# Patient Record
Sex: FEMALE | Race: WHITE | Employment: OTHER | URBAN - METROPOLITAN AREA
[De-identification: names, ages, dates, MRNs, and addresses within clinical notes are randomized per-mention and may not be internally consistent; named-entity substitution may affect disease eponyms.]

---

## 2019-02-26 ENCOUNTER — APPOINTMENT (OUTPATIENT)
Dept: CT IMAGING | Age: 49
End: 2019-02-26
Attending: EMERGENCY MEDICINE
Payer: COMMERCIAL

## 2019-02-26 ENCOUNTER — APPOINTMENT (OUTPATIENT)
Dept: GENERAL RADIOLOGY | Age: 49
End: 2019-02-26
Attending: EMERGENCY MEDICINE
Payer: COMMERCIAL

## 2019-02-26 ENCOUNTER — HOSPITAL ENCOUNTER (EMERGENCY)
Age: 49
Discharge: HOME | End: 2019-02-26
Attending: EMERGENCY MEDICINE
Payer: COMMERCIAL

## 2019-02-26 VITALS
TEMPERATURE: 98.1 F | DIASTOLIC BLOOD PRESSURE: 64 MMHG | HEART RATE: 55 BPM | BODY MASS INDEX: 32.43 KG/M2 | HEIGHT: 63 IN | WEIGHT: 183 LBS | OXYGEN SATURATION: 97 % | SYSTOLIC BLOOD PRESSURE: 122 MMHG | RESPIRATION RATE: 16 BRPM

## 2019-02-26 DIAGNOSIS — R07.89 ATYPICAL CHEST PAIN: Primary | ICD-10-CM

## 2019-02-26 DIAGNOSIS — R06.00 DYSPNEA, UNSPECIFIED TYPE: ICD-10-CM

## 2019-02-26 DIAGNOSIS — E27.8 ADRENAL NODULE (HCC): ICD-10-CM

## 2019-02-26 DIAGNOSIS — F43.0 STRESS REACTION: ICD-10-CM

## 2019-02-26 LAB
ANION GAP SERPL CALC-SCNC: 7 MMOL/L (ref 3–18)
ATRIAL RATE: 55 BPM
BASOPHILS # BLD: 0 K/UL (ref 0–0.1)
BASOPHILS NFR BLD: 1 % (ref 0–2)
BUN SERPL-MCNC: 13 MG/DL (ref 7–18)
BUN/CREAT SERPL: 15 (ref 12–20)
CALCIUM SERPL-MCNC: 9.4 MG/DL (ref 8.5–10.1)
CALCULATED P AXIS, ECG09: 55 DEGREES
CALCULATED R AXIS, ECG10: 51 DEGREES
CALCULATED T AXIS, ECG11: 57 DEGREES
CHLORIDE SERPL-SCNC: 109 MMOL/L (ref 100–108)
CO2 SERPL-SCNC: 28 MMOL/L (ref 21–32)
CREAT SERPL-MCNC: 0.87 MG/DL (ref 0.6–1.3)
D DIMER PPP FEU-MCNC: 0.71 UG/ML(FEU)
DIAGNOSIS, 93000: NORMAL
DIFFERENTIAL METHOD BLD: ABNORMAL
EOSINOPHIL # BLD: 0.2 K/UL (ref 0–0.4)
EOSINOPHIL NFR BLD: 2 % (ref 0–5)
ERYTHROCYTE [DISTWIDTH] IN BLOOD BY AUTOMATED COUNT: 12.5 % (ref 11.6–14.5)
GLUCOSE SERPL-MCNC: 93 MG/DL (ref 74–99)
HCT VFR BLD AUTO: 43.3 % (ref 35–45)
HGB BLD-MCNC: 14.4 G/DL (ref 12–16)
LYMPHOCYTES # BLD: 3.8 K/UL (ref 0.9–3.6)
LYMPHOCYTES NFR BLD: 43 % (ref 21–52)
MCH RBC QN AUTO: 30.1 PG (ref 24–34)
MCHC RBC AUTO-ENTMCNC: 33.3 G/DL (ref 31–37)
MCV RBC AUTO: 90.4 FL (ref 74–97)
MONOCYTES # BLD: 0.6 K/UL (ref 0.05–1.2)
MONOCYTES NFR BLD: 7 % (ref 3–10)
NEUTS SEG # BLD: 4.2 K/UL (ref 1.8–8)
NEUTS SEG NFR BLD: 47 % (ref 40–73)
P-R INTERVAL, ECG05: 186 MS
PLATELET # BLD AUTO: 301 K/UL (ref 135–420)
PMV BLD AUTO: 10 FL (ref 9.2–11.8)
POTASSIUM SERPL-SCNC: 4.4 MMOL/L (ref 3.5–5.5)
Q-T INTERVAL, ECG07: 426 MS
QRS DURATION, ECG06: 86 MS
QTC CALCULATION (BEZET), ECG08: 407 MS
RBC # BLD AUTO: 4.79 M/UL (ref 4.2–5.3)
SODIUM SERPL-SCNC: 144 MMOL/L (ref 136–145)
TROPONIN I SERPL-MCNC: <0.02 NG/ML (ref 0–0.04)
TSH SERPL DL<=0.05 MIU/L-ACNC: 3.28 UIU/ML (ref 0.36–3.74)
VENTRICULAR RATE, ECG03: 55 BPM
WBC # BLD AUTO: 8.9 K/UL (ref 4.6–13.2)

## 2019-02-26 PROCEDURE — 74011636320 HC RX REV CODE- 636/320: Performed by: EMERGENCY MEDICINE

## 2019-02-26 PROCEDURE — 71275 CT ANGIOGRAPHY CHEST: CPT

## 2019-02-26 PROCEDURE — 80048 BASIC METABOLIC PNL TOTAL CA: CPT

## 2019-02-26 PROCEDURE — 71045 X-RAY EXAM CHEST 1 VIEW: CPT

## 2019-02-26 PROCEDURE — 99285 EMERGENCY DEPT VISIT HI MDM: CPT

## 2019-02-26 PROCEDURE — 84443 ASSAY THYROID STIM HORMONE: CPT

## 2019-02-26 PROCEDURE — 85025 COMPLETE CBC W/AUTO DIFF WBC: CPT

## 2019-02-26 PROCEDURE — 96374 THER/PROPH/DIAG INJ IV PUSH: CPT

## 2019-02-26 PROCEDURE — 84484 ASSAY OF TROPONIN QUANT: CPT

## 2019-02-26 PROCEDURE — 93005 ELECTROCARDIOGRAM TRACING: CPT

## 2019-02-26 PROCEDURE — 85379 FIBRIN DEGRADATION QUANT: CPT

## 2019-02-26 PROCEDURE — 74011250636 HC RX REV CODE- 250/636: Performed by: EMERGENCY MEDICINE

## 2019-02-26 RX ORDER — NAPROXEN 500 MG/1
500 TABLET ORAL 2 TIMES DAILY WITH MEALS
Qty: 10 TAB | Refills: 0 | Status: SHIPPED | OUTPATIENT
Start: 2019-02-26 | End: 2019-03-03

## 2019-02-26 RX ORDER — BISMUTH SUBSALICYLATE 262 MG
1 TABLET,CHEWABLE ORAL DAILY
COMMUNITY

## 2019-02-26 RX ORDER — KETOROLAC TROMETHAMINE 30 MG/ML
30 INJECTION, SOLUTION INTRAMUSCULAR; INTRAVENOUS
Status: COMPLETED | OUTPATIENT
Start: 2019-02-26 | End: 2019-02-26

## 2019-02-26 RX ORDER — PROPRANOLOL HYDROCHLORIDE 60 MG/1
60 CAPSULE, EXTENDED RELEASE ORAL DAILY
COMMUNITY
End: 2019-03-05

## 2019-02-26 RX ADMIN — KETOROLAC TROMETHAMINE 30 MG: 30 INJECTION INTRAMUSCULAR; INTRAVENOUS at 13:28

## 2019-02-26 RX ADMIN — IOPAMIDOL 100 ML: 755 INJECTION, SOLUTION INTRAVENOUS at 12:39

## 2019-02-26 NOTE — ED PROVIDER NOTES
EMERGENCY DEPARTMENT HISTORY AND PHYSICAL EXAM 
 
9:43 AM 
 
 
Date: 2/26/2019 Patient Name: John Hardin History of Presenting Illness Chief Complaint Patient presents with  Chest Pain History Provided By: Patient Additional History (Context): John Hardin is a 51 y.o. female who presents with acute onset of chest pain that started last night. Describes chest pain as pressure and located in the center of her chest. Pt reports mild cough, which started today. Pt also reports shortness of breath only when ambulating. Reports no modifying factors for her symptoms. Pt also c/o constant bilateral heel pain for the past 6-8 months, states pain is worse with ambulation. 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 Notes she has been under a lot of stress. Denies any prior history of blood clots. Denies smoking, recent travel, vomiting, diarrhea, and any other symptoms or complaints. Notes her PCP is Dr. Shmuel Quezada.  
 
PCP: Unknown, Provider Chief Complaint: Chest Pain Duration:  Hours Timing:  Acute Location: Center of chest 
Quality: Pressure Severity: N/A Modifying Factors: None Associated Symptoms: Chills; Shortness of Breath; Cough; Bilateral Heel Pain Past History Past Medical History: 
Past Medical History:  
Diagnosis Date  Heart murmur  Hypothyroid Past Surgical History: 
Past Surgical History:  
Procedure Laterality Date  HX CHOLECYSTECTOMY  HX TONSIL AND ADENOIDECTOMY  HX TUBAL LIGATION Bilateral   
 
 
Family History: 
History reviewed. No pertinent family history. Social History: 
Social History Tobacco Use  Smoking status: Former Smoker  Smokeless tobacco: Never Used  Tobacco comment: as teenager Substance Use Topics  Alcohol use: Yes Comment: occational  
 Drug use: No  
 
 
Allergies: 
No Known Allergies Review of Systems Review of Systems Constitutional: Positive for chills. Negative for fever. HENT: Negative for congestion, rhinorrhea, sore throat and trouble swallowing. Eyes: Negative for visual disturbance. Respiratory: Positive for cough and shortness of breath. Negative for wheezing. Cardiovascular: Positive for chest pain. Gastrointestinal: Negative for abdominal pain, diarrhea, nausea and vomiting. Endocrine: Negative for polyuria. Genitourinary: Negative for dysuria. Musculoskeletal: Positive for arthralgias (bilateral heel pain). Negative for neck stiffness. Skin: Negative for pallor and rash. Neurological: Negative for dizziness, weakness, numbness and headaches. Hematological: Does not bruise/bleed easily. Psychiatric/Behavioral: Negative for confusion and dysphoric mood. All other systems reviewed and are negative. Physical Exam  
 
Visit Vitals /64 Pulse (!) 55 Temp 98.1 °F (36.7 °C) Resp 16 Ht 5' 3\" (1.6 m) Wt 83 kg (183 lb) SpO2 97% BMI 32.42 kg/m² Physical Exam  
Constitutional: She is oriented to person, place, and time. She appears well-developed and well-nourished. No distress. HENT:  
Head: Normocephalic and atraumatic. Mouth/Throat: Oropharynx is clear and moist.  
Eyes: Conjunctivae are normal. Pupils are equal, round, and reactive to light. No scleral icterus. Neck: Normal range of motion. Neck supple. Cardiovascular: Normal rate and intact distal pulses. Capillary refill < 3 seconds Pulmonary/Chest: Effort normal and breath sounds normal. No respiratory distress. She has no wheezes. Reproducible chest wall tenderness. Abdominal: Soft. Bowel sounds are normal. She exhibits no distension. There is no tenderness. Musculoskeletal: Normal range of motion. She exhibits no edema. No lower extremity edema. Tenderness to bilateral Achilles tendon. Negative Ngo test.   
Lymphadenopathy:  
  She has no cervical adenopathy. Neurological: She is alert and oriented to person, place, and time. No cranial nerve deficit. Skin: Skin is warm and dry. She is not diaphoretic. Nursing note and vitals reviewed. Diagnostic Study Results Labs - Recent Results (from the past 12 hour(s)) EKG, 12 LEAD, INITIAL Collection Time: 02/26/19  9:38 AM  
Result Value Ref Range Ventricular Rate 55 BPM  
 Atrial Rate 55 BPM  
 P-R Interval 186 ms QRS Duration 86 ms  
 Q-T Interval 426 ms  
 QTC Calculation (Bezet) 407 ms Calculated P Axis 55 degrees Calculated R Axis 51 degrees Calculated T Axis 57 degrees Diagnosis Sinus bradycardia Otherwise normal ECG No previous ECGs available CBC WITH AUTOMATED DIFF Collection Time: 02/26/19  9:43 AM  
Result Value Ref Range WBC 8.9 4.6 - 13.2 K/uL  
 RBC 4.79 4.20 - 5.30 M/uL  
 HGB 14.4 12.0 - 16.0 g/dL HCT 43.3 35.0 - 45.0 % MCV 90.4 74.0 - 97.0 FL  
 MCH 30.1 24.0 - 34.0 PG  
 MCHC 33.3 31.0 - 37.0 g/dL  
 RDW 12.5 11.6 - 14.5 % PLATELET 613 025 - 553 K/uL MPV 10.0 9.2 - 11.8 FL  
 NEUTROPHILS 47 40 - 73 % LYMPHOCYTES 43 21 - 52 % MONOCYTES 7 3 - 10 % EOSINOPHILS 2 0 - 5 % BASOPHILS 1 0 - 2 %  
 ABS. NEUTROPHILS 4.2 1.8 - 8.0 K/UL  
 ABS. LYMPHOCYTES 3.8 (H) 0.9 - 3.6 K/UL  
 ABS. MONOCYTES 0.6 0.05 - 1.2 K/UL  
 ABS. EOSINOPHILS 0.2 0.0 - 0.4 K/UL  
 ABS. BASOPHILS 0.0 0.0 - 0.1 K/UL  
 DF AUTOMATED METABOLIC PANEL, BASIC Collection Time: 02/26/19  9:43 AM  
Result Value Ref Range Sodium 144 136 - 145 mmol/L Potassium 4.4 3.5 - 5.5 mmol/L Chloride 109 (H) 100 - 108 mmol/L  
 CO2 28 21 - 32 mmol/L Anion gap 7 3.0 - 18 mmol/L Glucose 93 74 - 99 mg/dL BUN 13 7.0 - 18 MG/DL Creatinine 0.87 0.6 - 1.3 MG/DL  
 BUN/Creatinine ratio 15 12 - 20 GFR est AA >60 >60 ml/min/1.73m2 GFR est non-AA >60 >60 ml/min/1.73m2 Calcium 9.4 8.5 - 10.1 MG/DL  
TROPONIN I  Collection Time: 02/26/19  9:43 AM  
 Result Value Ref Range Troponin-I, QT <0.02 0.0 - 0.045 NG/ML  
TSH 3RD GENERATION Collection Time: 02/26/19  9:43 AM  
Result Value Ref Range TSH 3.28 0.36 - 3.74 uIU/mL  
D DIMER Collection Time: 02/26/19  9:43 AM  
Result Value Ref Range D DIMER 0.71 (H) <0.46 ug/ml(FEU) Radiologic Studies -  
CTA CHEST W OR W WO CONT Final Result IMPRESSION:  
       
1. No convincing evidence of acute pulmonary embolism. - Small questionable subsegmental artery branch level adherent smooth defects in  
the left inferior lingular subsegmental artery and left lower lobe middle basal  
subsegmental artery. Possibly volume averaging artifact vs. subacute to chronic PE. 2.  Left adrenal gland nodule. Query adrenal adenoma. Recommend dedicated  
adrenal protocol CT or MR for further delineation. XR CHEST PORT Final Result IMPRESSION:  
  
No acute finding. Medical Decision Making I am the first provider for this patient. I reviewed the vital signs, available nursing notes, past medical history, past surgical history, family history and social history. Vital Signs-Reviewed the patient's vital signs. Pulse Oximetry Analysis -  99% on room air, normal 
 
Cardiac Monitor: 
Rate: 59 Rhythm: Sinus Bradycardia EKG: Interpreted by the EP. Time Interpreted: 9:43 AM  
 Rate: 55 Rhythm: Sinus Bradycardia Interpretation: Normal axis. Normal QTC. No ST elevation. No T-wave inversion. Records Reviewed: Nursing Notes and Old Medical Records (Time of Review: 9:43 AM) Provider Notes (Medical Decision Making): MDM Number of Diagnoses or Management Options Diagnosis management comments: DDx cardiac, anxiety, metabolic , musculoskeletal, PE. Will check labs, EKG, chest X-ray, and give Toradol. Medications  
iopamidol (ISOVUE-370) 76 % injection 100 mL (100 mL IntraVENous Given 2/26/19 2884) ketorolac (TORADOL) injection 30 mg (30 mg IntraVENous Given 2/26/19 1326) ED Course: Progress Notes, Reevaluation, and Consults: 
Pt has low probability Wells' score. D-dimer elevated. Will get CTA chest to evaluate for PE. Chest X-ray negative. Other blood work reassuring. CTA shows no acute PE but questionable subacute to chronic PE vs artifact. 2:12 PM Consult: I discussed care with Dr. Corinne Pih (Pulmonology). It was a standard discussion including patient history, chief complaint, available diagnostic results, and predicted treatment course. She agrees patient can be discharged with outpatient follow up and further work up for possible VQ scan to evaluate outpatient. We discussed low probability of Wells' score. I have reassessed the patient. I have discussed the workup, results and plan with the patient and patient is in agreement. Patient is feeling better. Patient will be prescribed naprosyn. Patient was discharge in stable condition. Patient was given outpatient follow up. Patient is to return to emergency department if any new or worsening condition. Diagnosis Clinical Impression: 1. Atypical chest pain 2. Adrenal nodule ([address] 3. Stress reaction 4. Dyspnea, unspecified type Disposition: Discharged Follow-up Information Follow up With Specialties Details Why Contact Info Jeniffer Olivarez,  Cardiology Schedule an appointment as soon as possible for a visit in 2 days  [facility] [address] [facility] 
[phone] Winston Cantu MD Pulmonary Disease, Urgent Care, Internal Medicine Schedule an appointment as soon as possible for a visit in 2 days  [address] [facility] [address] [address] 
[phone] Black Ulloa MD Family Practice Schedule an appointment as soon as possible for a visit in 1 week  [address] 
[phone] [id number] [facility] EMERGENCY DEPT Emergency Medicine  As needed, If symptoms worsen Ronel Jain [id number] [phone] Medication List  
  
START taking these medications   
naproxen 500 mg tablet Commonly known as:  NAPROSYN Take 1 Tab by mouth two (2) times daily (with meals) for 5 days. For pain ASK your doctor about these medications   
multivitamin tablet Commonly known as:  ONE A DAY 
  
propranolol LA 60 mg SR capsule Commonly known as:  INDERAL LA 
  
SYNTHROID PO Where to Get Your Medications Information about where to get these medications is not yet available Ask your nurse or doctor about these medications · naproxen 500 mg tablet 
  
 
_______________________________ Attestations: 
Scribe Attestation Alicia Zarate acting as a scribe for and in the presence of Vincenzo ACOSTA DO February 26, 2019 at 9:43 AM 
    
Provider Attestation:     
I personally performed the services described in the documentation, reviewed the documentation, as recorded by the scribe in my presence, and it accurately and completely records my words and actions. February 26, 2019 at 9:43 AM - Mei Vitale DO   
_______________________________

## 2019-02-26 NOTE — ED TRIAGE NOTES
Pt presents with mid chest pain/pressure that started at midnight. Per pt thought it was reflux states ate papaya and sat upright, pain relieved some what but still feels the discomfort.

## 2019-02-26 NOTE — DISCHARGE INSTRUCTIONS
Patient Education        Chest Pain: Care Instructions  Your Care Instructions    There are many things that can cause chest pain. Some are not serious and will get better on their own in a few days. But some kinds of chest pain need more testing and treatment. Your doctor may have recommended a follow-up visit in the next 8 to 12 hours. If you are not getting better, you may need more tests or treatment. Even though your doctor has released you, you still need to watch for any problems. The doctor carefully checked you, but sometimes problems can develop later. If you have new symptoms or if your symptoms do not get better, get medical care right away. If you have worse or different chest pain or pressure that lasts more than 5 minutes or you passed out (lost consciousness), call 911 or seek other emergency help right away. A medical visit is only one step in your treatment. Even if you feel better, you still need to do what your doctor recommends, such as going to all suggested follow-up appointments and taking medicines exactly as directed. This will help you recover and help prevent future problems. How can you care for yourself at home? · Rest until you feel better. · Take your medicine exactly as prescribed. Call your doctor if you think you are having a problem with your medicine. · Do not drive after taking a prescription pain medicine. When should you call for help? Call 911 if:    · You passed out (lost consciousness).     · You have severe difficulty breathing.     · You have symptoms of a heart attack. These may include:  ? Chest pain or pressure, or a strange feeling in your chest.  ? Sweating. ? Shortness of breath. ? Nausea or vomiting. ? Pain, pressure, or a strange feeling in your back, neck, jaw, or upper belly or in one or both shoulders or arms. ? Lightheadedness or sudden weakness. ? A fast or irregular heartbeat.   After you call 911, the  may tell you to chew 1 adult-strength or 2 to 4 low-dose aspirin. Wait for an ambulance. Do not try to drive yourself.    Call your doctor today if:    · You have any trouble breathing.     · Your chest pain gets worse.     · You are dizzy or lightheaded, or you feel like you may faint.     · You are not getting better as expected.     · You are having new or different chest pain. Where can you learn more? Go to http://juan daniel-afshan.info/. Enter A120 in the search box to learn more about \"Chest Pain: Care Instructions. \"  Current as of: September 23, 2018  Content Version: 11.9  © 6739-1118 [employer]. Care instructions adapted under license by [employer] (which disclaims liability or warranty for this information). If you have questions about a medical condition or this instruction, always ask your healthcare professional. [place] [address] any warranty or liability for your use of this information. Patient Education        Learning About Stress  What is stress? Stress is what you feel when you have to handle more than you are used to. Stress is a fact of life for most people, and it affects everyone differently. What causes stress for you may not be stressful for someone else. A lot of things can cause stress. You may feel stress when you go on a job interview, take a test, or run a race. This kind of short-term stress is normal and even useful. It can help you if you need to work hard or react quickly. For example, stress can help you finish an important job on time. Stress also can last a long time. Long-term stress is caused by stressful situations or events. Examples of long-term stress include long-term health problems, ongoing problems at work, or conflicts in your family. Long-term stress can harm your health. How does stress affect your health? When you are stressed, your body responds as though you are in danger.  It makes hormones that speed up your heart, make you breathe faster, and give you a burst of energy. This is called the fight-or-flight stress response. If the stress is over quickly, your body goes back to normal and no harm is done. But if stress happens too often or lasts too long, it can have bad effects. Long-term stress can make you more likely to get sick, and it can make symptoms of some diseases worse. If you tense up when you are stressed, you may develop neck, shoulder, or low back pain. Stress is linked to high blood pressure and heart disease. Stress also harms your emotional health. It can make you wilson, tense, or depressed. Your relationships may suffer, and you may not do well at work or school. What can you do to manage stress? How to relax your mind  · Write. It may help to write about things that are bothering you. This helps you find out how much stress you feel and what is causing it. When you know this, you can find better ways to cope. · Let your feelings out. Talk, laugh, cry, and express anger when you need to. Talking with friends, family, a counselor, or a member of the clergy about your feelings is a healthy way to relieve stress. · Do something you enjoy. For example, listen to music or go to a movie. Practice your hobby or do volunteer work. · Meditate. This can help you relax, because you are not worrying about what happened before or what may happen in the future. · Do guided imagery. Imagine yourself in any setting that helps you feel calm. You can use audiotapes, books, or a teacher to guide you. How to relax your body  · Do something active. Exercise or activity can help reduce stress. Walking is a great way to get started. Even everyday activities such as housecleaning or yard work can help. · Do breathing exercises. For example:  ? From a standing position, bend forward from the waist with your knees slightly bent. Let your arms dangle close to the floor. ?  Breathe in slowly and deeply as you return to a standing position. Roll up slowly and lift your head last.  ? Hold your breath for just a few seconds in the standing position. ? Breathe out slowly and bend forward from the waist.  · Try yoga or rohit chi. These techniques combine exercise and meditation. You may need some training at first to learn them. What can you do to prevent stress? · Manage your time. This helps you find time to do the things you want and need to do. · Get enough sleep. Your body recovers from the stresses of the day while you are sleeping. · Get support. Your family, friends, and community can make a difference in how you experience stress. Where can you learn more? Go to http://juan daniel-afshan.info/. Enter J230 in the search box to learn more about \"Learning About Stress. \"  Current as of: June 28, 2018  Content Version: 11.9  © 9496-9384 [employer]. Care instructions adapted under license by [employer] (which disclaims liability or warranty for this information). If you have questions about a medical condition or this instruction, always ask your healthcare professional. [address] any warranty or liability for your use of this information.

## 2019-03-05 ENCOUNTER — OFFICE VISIT (OUTPATIENT)
Dept: CARDIOLOGY CLINIC | Age: 49
End: 2019-03-05

## 2019-03-05 VITALS
WEIGHT: 192 LBS | DIASTOLIC BLOOD PRESSURE: 80 MMHG | BODY MASS INDEX: 34.02 KG/M2 | HEIGHT: 63 IN | HEART RATE: 67 BPM | SYSTOLIC BLOOD PRESSURE: 142 MMHG | OXYGEN SATURATION: 98 %

## 2019-03-05 DIAGNOSIS — R07.9 CHEST PAIN, UNSPECIFIED TYPE: Primary | ICD-10-CM

## 2019-03-05 DIAGNOSIS — E78.00 HYPERCHOLESTEROLEMIA: ICD-10-CM

## 2019-03-05 NOTE — PROGRESS NOTES
HPI: I saw Olive Veliz today in my office in cardiovascular evaluation due to problems with chest pain. Ms. Patrick Cristobal is a pleasant 58-year-old lady who recently went to the emergency room on February 26, 2019 with chest pain. She relates that about 12 midnight on the morning of her ER visit she awoke with severe substernal indigestion or burning discomfort which was also described as a heaviness which was 10/10 in severity and associated with some shortness of breath. This discomfort was associated with some chest wall tenderness as well as some epigastric tenderness and she ultimately went to the emergency room on the morning of February 26, 2019 and due to her chest wall tenderness she was given naproxen for the discomfort. Since that time discomfort has persisted and is still 3 or 4/10 in severity right now. She does relate that the discomfort seems to be worse after eating at times and also it is worse when lying down at night. 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 Her risk factors for coronary disease include hypercholesterolemia which has not been treated and family history of coronary artery disease and some internal aunts although they apparently were in their 76s when they began to have heart disease issues. The patient does relate that she has had a mild heart murmur since childhood. Encounter Diagnoses   Name Primary?  Chest pain, unspecified type consistent with costochondritis, but with components to suggest GERD Yes    Hypercholesterolemia        Discussion:  This lady's chest pain certainly has components that sound like gastroesophageal reflux disease including the discomfort occurring at night and the fact that she has some epigastric tenderness, but she also has components that sound like costochondritis with tenderness to palpation over the third fourth and fifth costochondral junctions parasternally left greater than right. I would recommend that she be treated for both problems by using heat or hot patches for her chest and over-the-counter Nexium or Prilosec 1 tablet twice a day for 2 weeks and then 1 tablet a day for a month to see if her chest pain problems defervesce. She clearly does not have any symptoms to suggest the development of symptomatic obstructive coronary artery disease, but she does have history of hypercholesterolemia and is interested in knowing her risk of developing heart disease in the future I think it is reasonable for her to consider getting a coronary calcium score and her cholesterol checked and if her coronary calcium score is high enough I would recommend treating her cholesterol aggressively to get and keep her non-HDL cholesterol under 100 at a minimum and possibly even more aggressive lowering if she has a very high coronary calcium score. PCP: Moe Arango MD      Past Medical History:   Diagnosis Date    Heart murmur     Hypercholesterolemia     Hypothyroid        Past Surgical History:   Procedure Laterality Date    HX CARPAL TUNNEL RELEASE Bilateral     mid 2000's    HX CHOLECYSTECTOMY      HX TONSIL AND ADENOIDECTOMY      HX TUBAL LIGATION Bilateral        Current Outpatient Medications   Medication Sig    levothyroxine sodium (SYNTHROID PO) Take  by mouth daily.  multivitamin (ONE A DAY) tablet Take 1 Tab by mouth daily. No current facility-administered medications for this visit. No Known Allergies    Social History :  Social History     Tobacco Use    Smoking status: Former Smoker    Smokeless tobacco: Never Used    Tobacco comment: as teenager   Substance Use Topics    Alcohol use:  Yes Comment: occational        Family History: family history is not on file. Review of Systems:    Constitutional: Negative. HENT: Negative. Eyes: Negative. Respiratory: Negative. Cardiovascular: Positive for chest pain and palpitations. Negative for orthopnea, claudication, leg swelling and PND. Gastrointestinal: Positive for blood in stool, heartburn and nausea. Negative for abdominal pain, constipation, diarrhea, melena and vomiting. Genitourinary: Negative. Musculoskeletal: Positive for joint pain. Negative for back pain, falls, myalgias and neck pain. Skin: Negative. Neurological: Positive for dizziness and headaches. Negative for tingling, tremors, sensory change, speech change, focal weakness, seizures and loss of consciousness. Endo/Heme/Allergies: Negative. Physical Exam:    The patient is a cooperative, alert, well developed, well nourished 51 y.o.  female who is in no acute distress at the time of the examination. Visit Vitals  /80   Pulse 67   Ht 5' 3\" (1.6 m)   Wt 87.1 kg (192 lb)   SpO2 98%   BMI 34.01 kg/m²       HEENT: Conjuctiva white, mucosa moist, no pallor or cyanosis. NECK: Supple without masses, tenderness or thyromegaly. There was no jugular venous distention. Carotid are full bilaterally without bruits. CHEST: Symmetrical with good excursion. There is tenderness to palpation over the third, fourth, and fifth costochondral junctions parasternally bilaterally but more marked on the left which largely reproduces her pain. LUNGS: Clear to auscultation in all fields. HEART: The apex is not displaced. There were no lifts, thrills or heaves. There is a normal S1 and S2 without appreciable murmurs, rubs, clicks, or gallops auscultated. ABDOMEN: Soft without masses or organomegaly. There is epigastric tenderness to palpation. EXTREMITIES: Full peripheral pulses without peripheral edema.   INTEGUMENT: Warm and dry   NEUROLOGICAL: The patient is oriented x 3 with motor function grossly intact. Review of Data: See PMH and Cardiology and Imaging sections for cardiac testing  No results found for: CHOL, CHOLX, CHLST, CHOLV, HDL, LDL, LDLC, DLDLP, TGLX, TRIGL, TRIGP, CHHD, CHHDX    Results for orders placed or performed in visit on 03/05/19   AMB POC EKG ROUTINE W/ 12 LEADS, INTER & REP     Status: None    Narrative    Normal sinus rhythm rate 67. This EKG is within normal limits. Timoteo Artis D.O., F.A.C.C. [facility]  [facility] [place]  [address] [facility]  [phone]      PLEASE NOTE:  This document has been produced using voice recognition software. Unrecognized errors in transcription may be present.

## 2019-03-05 NOTE — PATIENT INSTRUCTIONS
May consider elevating head at rest to help acid reflux    Warm compress for pain in chest area as described    NEXIUM or Prilosec   1 tablet by mouth twice a day for 2 weeks, then take one tablet daily for reflux.

## 2019-04-09 ENCOUNTER — HOSPITAL ENCOUNTER (OUTPATIENT)
Dept: LAB | Age: 49
Discharge: HOME | End: 2019-04-09
Payer: COMMERCIAL

## 2019-04-09 ENCOUNTER — OFFICE VISIT (OUTPATIENT)
Dept: PULMONOLOGY | Age: 49
End: 2019-04-09

## 2019-04-09 VITALS
BODY MASS INDEX: 33.66 KG/M2 | HEART RATE: 74 BPM | SYSTOLIC BLOOD PRESSURE: 130 MMHG | OXYGEN SATURATION: 96 % | HEIGHT: 63 IN | TEMPERATURE: 98.2 F | WEIGHT: 190 LBS | RESPIRATION RATE: 19 BRPM | DIASTOLIC BLOOD PRESSURE: 88 MMHG

## 2019-04-09 DIAGNOSIS — R93.89 ABNORMAL CHEST CT: ICD-10-CM

## 2019-04-09 DIAGNOSIS — R06.00 DYSPNEA, UNSPECIFIED TYPE: Primary | ICD-10-CM

## 2019-04-09 PROBLEM — R06.02 SOB (SHORTNESS OF BREATH): Status: ACTIVE | Noted: 2019-02-26

## 2019-04-09 PROBLEM — R07.9 CHEST PAIN OF UNKNOWN ETIOLOGY: Status: ACTIVE | Noted: 2019-02-26

## 2019-04-09 LAB — D DIMER PPP FEU-MCNC: 0.7 UG/ML(FEU)

## 2019-04-09 PROCEDURE — 85302 CLOT INHIBIT PROT C ANTIGEN: CPT

## 2019-04-09 PROCEDURE — 85300 ANTITHROMBIN III ACTIVITY: CPT

## 2019-04-09 PROCEDURE — 85305 CLOT INHIBIT PROT S TOTAL: CPT

## 2019-04-09 PROCEDURE — 85379 FIBRIN DEGRADATION QUANT: CPT

## 2019-04-09 PROCEDURE — 83090 ASSAY OF HOMOCYSTEINE: CPT

## 2019-04-09 PROCEDURE — 36415 COLL VENOUS BLD VENIPUNCTURE: CPT

## 2019-04-09 PROCEDURE — 81240 F2 GENE: CPT

## 2019-04-09 PROCEDURE — 81241 F5 GENE: CPT

## 2019-04-09 RX ORDER — OMEPRAZOLE 10 MG/1
10 CAPSULE, DELAYED RELEASE ORAL DAILY
COMMUNITY

## 2019-04-09 RX ORDER — UREA 10 %
100 LOTION (ML) TOPICAL DAILY
COMMUNITY

## 2019-04-09 NOTE — PROGRESS NOTES
Elyssa Garza presents today for Chief Complaint Patient presents with  Shortness of Breath  
  referred by Dr. Safia Montgomery [facility] ED Doctor); CTA/CXR 2/26/2019  Chest Pain Is someone accompanying this pt? Yes. Significant Other Is the patient using any DME equipment during OV? no  
 -DME Company N/A Depression Screening: 
3 most recent PHQ Screens 4/9/2019 Little interest or pleasure in doing things Several days Feeling down, depressed, irritable, or hopeless Several days Total Score PHQ 2 2 Learning Assessment: 
No flowsheet data found. Abuse Screening: No flowsheet data found. Fall Risk No flowsheet data found. Coordination of Care: 1. Have you been to the ER, urgent care clinic since your last visit? Hospitalized since your last visit? Yes; Where: 2/26/2019, When: [facility] ED Chest pain/dyspnea 2. Have you seen or consulted any other health care providers outside of the [address] [place] [address] since your last visit? Include any pap smears or colon screening. Yes. Dr. Florence Serna, PCP Medication variance in dosage/sig per patient as follows: None Medication's patient's would liked removed:  None

## 2019-04-09 NOTE — PROGRESS NOTES
[facility] Pulmonary, Critical Care, and Sleep Medicine Dear Aniyah Smith, Chief complaint: 
Possible pulmonary emboli HPI: 
Hayley Amado is 51years old and comes to the office today at your request concerning a CTA of chest which showed possible pulmonary emboli. At that time the patient was having rather sudden and severe chest discomfort. She also relates that she continues to have chest discomfort at times especially with exertion or stress and that she also has dyspnea on exertion but which she attributes to her weight and it occurs with activity such as walking upstairs or long walks. She denies a chronic cough or significant leg swelling or leg pain except in her Achilles tendons. No Known Allergies Current Outpatient Medications Medication Sig  cyanocobalamin (VITAMIN B-12) 100 mcg tablet Take 100 mcg by mouth daily.  omeprazole (PRILOSEC) 10 mg capsule Take 10 mg by mouth daily.  levothyroxine sodium (SYNTHROID PO) Take 1 Tab by mouth daily.  multivitamin (ONE A DAY) tablet Take 1 Tab by mouth daily. No current facility-administered medications for this visit. Past Medical History:  
Diagnosis Date  Heart murmur  Hypercholesterolemia  Hypothyroid She denies a history of diabetes hypertension kidney disease liver disease ulcers tuberculosis or cancer Past Surgical History:  
Procedure Laterality Date  HX CARPAL TUNNEL RELEASE Bilateral   
 mid 2000's  HX CHOLECYSTECTOMY  HX TONSIL AND ADENOIDECTOMY  HX TUBAL LIGATION Bilateral   
 
 
Family history: Cancer Social History: Except for a short period of smoking around the age of 23 she has not smokes cigarettes. She works as a [employer] Review of systems: 
She denies fever chills poor appetite weight loss syncope focal muscle weakness or numbness trouble hearing trouble seeing chronic abdominal pain melena or blood in her stools dysuria hematuria rashes but reports trouble with swallowing which has been investigated in the past and arthritic complaints Physical Exam: 
Visit Vitals /88 (BP 1 Location: Left arm, BP Patient Position: At rest) Pulse 74 Temp 98.2 °F (36.8 °C) (Oral) Resp 19 Ht 5' 3\" (1.6 m) Wt 86.2 kg (190 lb) SpO2 96% BMI 33.66 kg/m² Well-developed well-nourished HEENT: pupils equal, reactive, sclera, non-icteric Oropharynx tongue: normal  
Neck: Supple Lymph Nodes: Supra clavicular and cervical nodes, negative Chest: Equal symmetrical expansion, no dullness, no wheezes, rales or rubs Heart: Regular, rhythm without portia or murmur no carotid bruits Abdomen: soft, non-tender no masses or organomegaly Extremities: no cyanosis, clubbing, no edema no calf tenderness Skin: No rash Neurological: alert, oriented, moves all extremities LABS: 
CTA of the chest 2/26/19 personally reviewed with several branching arteries with possible defects. D-dimer 2/26/19: Elevated at 0.71 upper range of normal 0.46 Pulmonary function tests 4/9/19: Normal spirometry lung volumes and borderline normal diffusion capacity Impression: It is not clear whether these arterial defects represent pulmonary emboli from the history and physical exam or even examination of the lung scan. Because of the importance of understanding if the patient has a hypercoagulable state we will proceed with a hypercoagulable profile repeat d-dimer and nuclear medicine lung scan to rule out chronic thromboembolic disease Plan: Hypercoagulable panel, d-dimer, lung scan Follow-up in 3 weeks Thanks for allowing me to share in this patient's evaluation Sincerely, Sandra Brody MD , [facility] 
 
CC: Krishna Douglas MD 
 
[address] [facility] [address], [address] [address]     P: [phone]     F: [phone]

## 2019-04-09 NOTE — PROGRESS NOTES
Verbal Order with read back per Dr. Kim Massey MD  For PFT smart panel. AMB POC PFT complete w/ bronchodilator AMB POC PFT complete w/o bronchodilator Gas Dilute/ wash out lung vol w/wo distrib vet & vol 
Diffusing capacity Dr. Kim Massey MD will co-sign the orders.

## 2019-04-10 LAB
AT III AG PPP IA-ACNC: 112 % (ref 72–124)
AT III PPP CHRO-ACNC: 103 % (ref 75–135)
HCYS SERPL-SCNC: 9.7 UMOL/L (ref 0–15)
PROT S ACT/NOR PPP: 106 % (ref 57–157)
PROT S PPP-ACNC: 119 % (ref 60–150)

## 2019-04-11 LAB
PROT C AG PPP IA-ACNC: 123 % (ref 60–150)
PROT C PPP-ACNC: 143 % (ref 73–180)

## 2019-04-12 DIAGNOSIS — R93.89 ABNORMAL CHEST CT: ICD-10-CM

## 2019-04-12 DIAGNOSIS — R06.00 DYSPNEA, UNSPECIFIED TYPE: Primary | ICD-10-CM

## 2019-04-15 LAB
F2 GENE MUT ANL BLD/T: NORMAL
F5 GENE MUT ANL BLD/T: NORMAL

## 2019-04-30 ENCOUNTER — HOSPITAL ENCOUNTER (OUTPATIENT)
Dept: GENERAL RADIOLOGY | Age: 49
Discharge: HOME | End: 2019-04-30
Attending: INTERNAL MEDICINE
Payer: COMMERCIAL

## 2019-04-30 ENCOUNTER — HOSPITAL ENCOUNTER (OUTPATIENT)
Dept: NUCLEAR MEDICINE | Age: 49
Discharge: HOME | End: 2019-04-30
Attending: INTERNAL MEDICINE
Payer: COMMERCIAL

## 2019-04-30 DIAGNOSIS — R06.00 DYSPNEA, UNSPECIFIED TYPE: ICD-10-CM

## 2019-04-30 DIAGNOSIS — R93.89 ABNORMAL CHEST CT: ICD-10-CM

## 2019-04-30 PROCEDURE — 71046 X-RAY EXAM CHEST 2 VIEWS: CPT

## 2019-04-30 PROCEDURE — 78582 LUNG VENTILAT&PERFUS IMAGING: CPT

## 2019-05-01 ENCOUNTER — OFFICE VISIT (OUTPATIENT)
Dept: PULMONOLOGY | Age: 49
End: 2019-05-01

## 2019-05-01 VITALS
DIASTOLIC BLOOD PRESSURE: 66 MMHG | OXYGEN SATURATION: 96 % | RESPIRATION RATE: 20 BRPM | HEART RATE: 67 BPM | SYSTOLIC BLOOD PRESSURE: 108 MMHG | BODY MASS INDEX: 32.96 KG/M2 | TEMPERATURE: 98.1 F | WEIGHT: 186 LBS | HEIGHT: 63 IN

## 2019-05-01 DIAGNOSIS — R93.89 ABNORMAL CHEST CT: ICD-10-CM

## 2019-05-01 DIAGNOSIS — R06.00 DYSPNEA, UNSPECIFIED TYPE: Primary | ICD-10-CM

## 2019-05-01 NOTE — PATIENT INSTRUCTIONS
Always call for symptoms such as unexplained shortness of breath unexplained dizziness unexplained chest discomfort    Also call for symptoms such as leg swelling or discomfort    Obtain mammogram and GYN eval and please send me the reports    Obtain d-dimer exam several weeks before next visit

## 2019-05-01 NOTE — PROGRESS NOTES
[facility]  Pulmonary, Critical Care, and Sleep Medicine      Chief complaint:  Possible pulmonary emboli elevated d-dimer    HPI:    Carolina Mann    is 51years old returns the office today for follow-up concerning an elevated d-dimer and a possible pulmonary emboli several months. Now the patient denies any chest pain shortness of breath leg swelling or leg pain or abdominal pain      No Known Allergies  Current Outpatient Medications   Medication Sig    cyanocobalamin (VITAMIN B-12) 100 mcg tablet Take 100 mcg by mouth daily.  omeprazole (PRILOSEC) 10 mg capsule Take 10 mg by mouth daily.  levothyroxine sodium (SYNTHROID PO) Take 1 Tab by mouth daily.  multivitamin (ONE A DAY) tablet Take 1 Tab by mouth daily. No current facility-administered medications for this visit.       Past Medical History:   Diagnosis Date    Heart murmur     Hypercholesterolemia     Hypothyroid      Past Surgical History:   Procedure Laterality Date    HX CARPAL TUNNEL RELEASE Bilateral     mid     HX CHOLECYSTECTOMY      HX TONSIL AND ADENOIDECTOMY      HX TUBAL LIGATION Bilateral      Social History     Socioeconomic History    Marital status:      Spouse name: Not on file    Number of children: Not on file    Years of education: Not on file    Highest education level: Not on file   Occupational History    Not on file   Social Needs    Financial resource strain: Not on file    Food insecurity:     Worry: Not on file     Inability: Not on file    Transportation needs:     Medical: Not on file     Non-medical: Not on file   Tobacco Use    Smoking status: Former Smoker     Types: Cigarettes     Start date: 1980     Last attempt to quit: 1981     Years since quittin.6    Smokeless tobacco: Never Used    Tobacco comment: as teenager   Substance and Sexual Activity    Alcohol use: Yes     Comment: occational    Drug use: No    Sexual activity: Yes Lifestyle    Physical activity:     Days per week: Not on file     Minutes per session: Not on file    Stress: Not on file   Relationships    Social connections:     Talks on phone: Not on file     Gets together: Not on file     Attends Hoahaoism service: Not on file     Active member of club or organization: Not on file     Attends meetings of clubs or organizations: Not on file     Relationship status: Not on file    Intimate partner violence:     Fear of current or ex partner: Not on file     Emotionally abused: Not on file     Physically abused: Not on file     Forced sexual activity: Not on file   Other Topics Concern    Not on file   Social History Narrative    Not on file     Family History   Problem Relation Age of Onset    Cancer Father     Cancer Maternal Aunt     Cancer Paternal Aunt     Cancer Half-brother        Review of systems:  She denies fever chills poor appetite or weight loss    Physical Exam:  Visit Vitals  /66 (BP 1 Location: Left arm, BP Patient Position: Sitting)   Pulse 67   Temp 98.1 °F (36.7 °C)   Resp 20   Ht 5' 3\" (1.6 m)   Wt 84.4 kg (186 lb)   SpO2 96%   BMI 32.95 kg/m²       Well-developed well-nourished  HEENT: WNL  Lymph node exam: Supraclavicular cervical lymph nodes negative  Chest: Equal symmetrical expansion no dullness no wheezes rales or rubs  Heart: Regular rhythm no gallop no murmur no JVD edema  Extremities: No cyanosis clubbing or calf tenderness  Abdomen: Soft nontender no organomegaly masses  Neurological: Alert and oriented    Labs: Anticoagulation studies 4/9/2019: Negative  O2 sat room air rest 96%  D-dimer 4/9/19: Elevated at 0.71  Impression:     I suspect the patient has had a pulmonary embolus but she is asymptomatic now and there is no definite proof I am concerned that she has a positive d-dimer. However she has no obvious reason for her elevated d-dimer at this time.   The patient however should have a full gynecological evaluation because of her history of endometriosis possible malignancy and also evaluation for asymptomatic thrombotic disease of the lower extremities    Plan:   Mammogram  GYN eval  PVLs lower extremities  Repeat d-dimer in 6 months with follow-up or sooner if needed    Umesh Carter MD , [facility]    CC: Romana Salazar MD     [address] [facility] [facility] [address]  [place], [address] [address]     P: [phone]     F: [phone]

## 2019-05-01 NOTE — PROGRESS NOTES
The pt. Denies usual resp. C/os. She is anxious to hear her lab results. Alpesh Cardoso presents today for   Chief Complaint   Patient presents with    Other     F/u to 4/8 Labs 4/9, CXR & Lung Scan 4/30       Is someone accompanying this pt? Yes, her     Is the patient using any DME equipment during [address]? No   -DME Company N/A    Depression Screening:  3 most recent PHQ Screens 4/9/2019   Little interest or pleasure in doing things Several days   Feeling down, depressed, irritable, or hopeless Several days   Total Score PHQ 2 2       Learning Assessment:  No flowsheet data found. Abuse Screening:  No flowsheet data found. Fall Risk  No flowsheet data found. Coordination of Care:  1. Have you been to the ER, urgent care clinic since your last visit? Hospitalized since your last visit? No    2. Have you seen or consulted any other health care providers outside of the [address] [place] [address] since your last visit? Not up to date with PCP or Mammogram.    Medication variance in dosage/sig per patient as follows: Per Med. Rec.

## 2019-06-10 ENCOUNTER — HOSPITAL ENCOUNTER (OUTPATIENT)
Dept: VASCULAR SURGERY | Age: 49
Discharge: HOME | End: 2019-06-10
Attending: INTERNAL MEDICINE
Payer: COMMERCIAL

## 2019-06-10 DIAGNOSIS — R06.00 DYSPNEA, UNSPECIFIED TYPE: ICD-10-CM

## 2019-06-10 DIAGNOSIS — R93.89 ABNORMAL CHEST CT: ICD-10-CM

## 2019-06-10 PROCEDURE — 93970 EXTREMITY STUDY: CPT

## 2019-06-14 ENCOUNTER — HOSPITAL ENCOUNTER (OUTPATIENT)
Dept: CT IMAGING | Age: 49
Discharge: HOME | End: 2019-06-14
Attending: INTERNAL MEDICINE
Payer: SELF-PAY

## 2019-06-14 DIAGNOSIS — Z13.6 SCREENING FOR OTHER AND UNSPECIFIED CARDIOVASCULAR CONDITIONS: ICD-10-CM

## 2019-06-14 PROCEDURE — 75571 CT HRT W/O DYE W/CA TEST: CPT

## 2022-03-19 PROBLEM — R06.02 SOB (SHORTNESS OF BREATH): Status: ACTIVE | Noted: 2019-02-26

## 2022-03-20 PROBLEM — R07.9 CHEST PAIN OF UNKNOWN ETIOLOGY: Status: ACTIVE | Noted: 2019-02-26

## 2023-01-31 RX ORDER — OMEPRAZOLE 10 MG/1
10 CAPSULE, DELAYED RELEASE ORAL DAILY
COMMUNITY